# Patient Record
Sex: FEMALE | Race: WHITE | NOT HISPANIC OR LATINO | URBAN - METROPOLITAN AREA
[De-identification: names, ages, dates, MRNs, and addresses within clinical notes are randomized per-mention and may not be internally consistent; named-entity substitution may affect disease eponyms.]

---

## 2023-01-16 ENCOUNTER — OFFICE VISIT (OUTPATIENT)
Dept: URGENT CARE | Facility: URGENT CARE | Age: 38
End: 2023-01-16
Payer: COMMERCIAL

## 2023-01-16 VITALS
HEART RATE: 110 BPM | DIASTOLIC BLOOD PRESSURE: 87 MMHG | RESPIRATION RATE: 20 BRPM | TEMPERATURE: 99.1 F | OXYGEN SATURATION: 95 % | SYSTOLIC BLOOD PRESSURE: 151 MMHG
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
## 2023-01-16 DIAGNOSIS — S51.852A DOG BITE OF LEFT FOREARM, INITIAL ENCOUNTER: Primary | ICD-10-CM

## 2023-01-16 PROCEDURE — 99203 OFFICE O/P NEW LOW 30 MIN: CPT | Performed by: FAMILY MEDICINE

## 2023-01-16 RX ORDER — HYDROCHLOROTHIAZIDE 12.5 MG/1
1 TABLET ORAL DAILY
COMMUNITY
Start: 2022-03-24 | End: 2023-03-24

## 2023-01-16 NOTE — PROGRESS NOTES
SUBJECTIVE:  Delfina Acuna is a 37 year old female who presents with a chief complaint of an animal bite on the forearm  left.  She was bitten by a dog today.   Cicumstances of bite: animals fighting. .  Animal's immunizations up to date   Associated symptoms: immediate pain    last tetanus booster within 10 years    No past medical history on file.    Current Outpatient Medications:      hydrochlorothiazide (HYDRODIURIL) 12.5 MG tablet, Take 1 tablet by mouth daily, Disp: , Rfl:   Social History     Tobacco Use     Smoking status: Never     Smokeless tobacco: Never   Substance Use Topics     Alcohol use: Not Currently       ROS:  CONSTITUTIONAL:NEGATIVE for fever, chills, change in weight    OBJECTIVE:  BP (!) 151/87   Pulse 110   Temp 99.1  F (37.3  C)   Resp 20   SpO2 95%   GENERAL: healthy, alert no acute distress  SKIN: puncture wound of forearm  left  MS:extremities normal- no gross deformities noted,  FROM noted in all extremities  NEURO: Normal strength and tone, sensory exam grossly normal,  normal speech and mentation    ASSESSMENT:    ICD-10-CM    1. Dog bite of left forearm, initial encounter  S51.852A     W54.0XXA         Pt on antibioritcs

## 2023-02-11 ENCOUNTER — HEALTH MAINTENANCE LETTER (OUTPATIENT)
Age: 38
End: 2023-02-11

## 2024-03-09 ENCOUNTER — HEALTH MAINTENANCE LETTER (OUTPATIENT)
Age: 39
End: 2024-03-09

## 2025-03-16 ENCOUNTER — HEALTH MAINTENANCE LETTER (OUTPATIENT)
Age: 40
End: 2025-03-16